# Patient Record
Sex: MALE | Race: OTHER | NOT HISPANIC OR LATINO | URBAN - METROPOLITAN AREA
[De-identification: names, ages, dates, MRNs, and addresses within clinical notes are randomized per-mention and may not be internally consistent; named-entity substitution may affect disease eponyms.]

---

## 2020-07-06 ENCOUNTER — OUTPATIENT (OUTPATIENT)
Dept: OUTPATIENT SERVICES | Facility: HOSPITAL | Age: 55
LOS: 1 days | Discharge: HOME | End: 2020-07-06

## 2020-07-06 DIAGNOSIS — Z11.59 ENCOUNTER FOR SCREENING FOR OTHER VIRAL DISEASES: ICD-10-CM

## 2020-07-08 ENCOUNTER — OUTPATIENT (OUTPATIENT)
Dept: OUTPATIENT SERVICES | Facility: HOSPITAL | Age: 55
LOS: 1 days | Discharge: HOME | End: 2020-07-08

## 2020-07-08 VITALS — RESPIRATION RATE: 15 BRPM | DIASTOLIC BLOOD PRESSURE: 74 MMHG | SYSTOLIC BLOOD PRESSURE: 156 MMHG | HEART RATE: 57 BPM

## 2020-07-08 VITALS — SYSTOLIC BLOOD PRESSURE: 130 MMHG | HEART RATE: 70 BPM | RESPIRATION RATE: 20 BRPM | DIASTOLIC BLOOD PRESSURE: 70 MMHG

## 2020-07-08 DIAGNOSIS — Z98.890 OTHER SPECIFIED POSTPROCEDURAL STATES: Chronic | ICD-10-CM

## 2020-07-08 RX ORDER — PSYLLIUM SEED (WITH DEXTROSE)
0 POWDER (GRAM) ORAL
Qty: 0 | Refills: 0 | DISCHARGE

## 2020-07-08 NOTE — PACU DISCHARGE NOTE - COMMENTS
55 y o male S/P Right ECCE with IOL Implant, TIVA without complications. VS /76 HR 61 RR 16 SaO2 99%. Pt tolerated procedure well.

## 2020-07-14 DIAGNOSIS — Z88.0 ALLERGY STATUS TO PENICILLIN: ICD-10-CM

## 2020-07-14 DIAGNOSIS — H25.11 AGE-RELATED NUCLEAR CATARACT, RIGHT EYE: ICD-10-CM

## 2022-04-08 NOTE — ASU PATIENT PROFILE, ADULT - AS SC BRADEN NUTRITION
Ongoing SW/CM Assessment/Plan of Care Note     See SW/CM flowsheets for goals and other objective data.    Patient/Family discharge goal (s):  Goal #1: Adjustment/coping issues addressed  Goal #2: Communication facilitated  Goal #3: Psychosocial needs assessed    PT Recommendation:  Recommendation for Discharge: PT WI: Home       OT Recommendation:  Recommendations for Discharge: OT WI: Home, Home therapy       SLP Recommendation:  Recommendations for Discharge: SLP: Home    Disposition:  Planned Discharge Destination: Home/apartment with Spouse/SO    Progress note:     Pt active with [facility] currently. Pending further medical stability and recommendations for discharge planning.      Devonte Cordoba MSW, APSW  Medical Social Worker           (4) excellent